# Patient Record
Sex: MALE | Race: WHITE | NOT HISPANIC OR LATINO | Employment: UNEMPLOYED | URBAN - METROPOLITAN AREA
[De-identification: names, ages, dates, MRNs, and addresses within clinical notes are randomized per-mention and may not be internally consistent; named-entity substitution may affect disease eponyms.]

---

## 2021-09-23 ENCOUNTER — HOSPITAL ENCOUNTER (OUTPATIENT)
Facility: MEDICAL CENTER | Age: 36
End: 2021-09-23
Attending: STUDENT IN AN ORGANIZED HEALTH CARE EDUCATION/TRAINING PROGRAM | Admitting: STUDENT IN AN ORGANIZED HEALTH CARE EDUCATION/TRAINING PROGRAM

## 2021-09-23 PROBLEM — S54.10XA MEDIAN NERVE LACERATION: Status: ACTIVE | Noted: 2021-09-23

## 2021-09-23 NOTE — H&P (VIEW-ONLY)
HAND & UPPER EXTREMITY  NEW PATIENT VISIT    Date of visit: 21   Referring Provider: Vy Chaudhry M.D.  PCP: No primary care provider on file.    REASON FOR VISIT:   Right wrist laceration    HISTORY:Enoch Gallego is a pleasant 36 y.o. right  hand dominant male here for a laceration to his right volar wrist. He was cleaning grout with a sheila wheel attachment on yesterday around 2pm, when it slipped and cut his volar wrist. He had moderate bleeding, and immediate loss of sensation to his thumb, index, middle and ring fingers. He was seen at the local ED where he underwent a washout and laceration repair. His tetanus is up to date (received it 4-5 months ago after a knee injury).       PAST MEDICAL HISTORY:   History reviewed. No pertinent past medical history.    PAST SURGICAL HISTORY:   History reviewed. No pertinent surgical history.    SOCIAL HISTORY:   Social History     Tobacco Use   Smoking Status Former Smoker   • Types: Cigarettes   • Quit date: 2016   • Years since quittin.8   Smokeless Tobacco Never Used     Occupation: Contractor    MEDICATIONS:    Current Outpatient Medications:   •  cephALEXin (KEFLEX) 500 MG Cap, Take 500 mg by mouth., Disp: , Rfl:   •  oxyCODONE-acetaminophen (PERCOCET) 5-325 MG Tab, Take 1 Tablet by mouth., Disp: , Rfl:   •  oxyCODONE immediate-release (ROXICODONE) 5 MG Tab, Take 1 Tablet by mouth every four hours as needed for Severe Pain for up to 5 days., Disp: 30 Tablet, Rfl: 0    ALLERGIES:  Hydrocodone-acetaminophen    PHYSICAL EXAM:   General: No acute distress  Respiratory: Unlabored on room air    Upper extremities:   Skin is clean and dry with no lacerations or abrasions.   Digits warm and well-perfused with 2 second capillary refill.  There is a 5.2cm transverse laceration in the proximal wrist.  There are a few scattered interrupted simple nylon sutures in place.  No surrounding erythema, drainage.  Appropriate swelling.  FDP and FDS are intact  to the index, middle, and small fingers.  FDP to the ring finger is intact, there is some slight pain and weakness with resisted flexion of the PIP joint of the ring finger.  FPL is intact. Kapandji score of 6.  He is able to make composite fist and fully extend all fingers.  APB 3 out of 5.  Radial and ulnar pulses are palpable at the level distal to the laceration.  + Tinel's test at laceration.     2-pt discrimination:   Right Left   Thumb >15mm <5   Index >15mm <5   Middle >15mm <5   Ring <5 radial and ulnar  <5   Small <5 <5       IMAGING:   Unable to obtain official images, however report from outside hospital of the forearm yesterday is negative for fracture or foreign body.    ASSESSMENT/PLAN: Enoch Gallego is a 36 y.o. male who presents with numbness in the median nerve distribution after sustaining a laceration to his volar wrist on 9/22/2021. History, physical exam and imaging are consistent with at the minimum a partial (possible complete) median nerve laceration. On exam he also has some painful weakness of his right ring finger FDS, FCU, and FCR.     We reviewed anatomy and discussed possible diagnoses at length.  I discussed my suspicion of a partial if not complete median nerve laceration.  What is reassuring is his maintained opposition, with a Kapandji score of 6, and present albeit weak APB.  I also discussed the possibility for tendon lacerations.  Recommend washout, exploration, repair of injured structures.  I discussed that if he does in fact have significant damage to the median nerve and I am unable to repair this primarily, I will likely harvest an ipsilateral sural nerve to aid in reconstruction.  The alternative would be use of an axogen allograft.  We will likely also placed a nerve wrap at the time of surgery.  I also discussed the need for an extended carpal tunnel release.  He voiced understanding and wishes to proceed.    We also discussed the expected postoperative course at  length.    The patient was told of the risks, benefits, and alternatives to the procedure. Risks included but not limited to bleeding, neuroma, persistent symptoms tendon rupture, infection, injury to neurovascular and tendinous structures, risk of exposure to COVID-19 within the facility, and the need for subsequent surgeries. Advanced directives were discussed,team approach explained, the role of overlapping surgeries, information for medical photography. The patient consented and wished to proceed. All questions pertaining to the procedure and these risks were answered and the patient agreed to proceed.      1. Dressing placed on incision today, and placed in a removable wrist cock-up splint.    2. Recommend Tylenol and ibuprofen, alternating this every 3 hours.  I did provide a prescription today for oxycodone for breakthrough pain.  I also discussed that I may prescribe gabapentin in the future to help with nerve pain.  3. Request for stat surgery placed      Vy Chaudhry M.D.

## 2021-09-24 ENCOUNTER — ANESTHESIA (OUTPATIENT)
Dept: SURGERY | Facility: MEDICAL CENTER | Age: 36
End: 2021-09-24

## 2021-09-24 ENCOUNTER — HOSPITAL ENCOUNTER (OUTPATIENT)
Facility: MEDICAL CENTER | Age: 36
End: 2021-09-24
Attending: STUDENT IN AN ORGANIZED HEALTH CARE EDUCATION/TRAINING PROGRAM | Admitting: STUDENT IN AN ORGANIZED HEALTH CARE EDUCATION/TRAINING PROGRAM

## 2021-09-24 ENCOUNTER — ANESTHESIA EVENT (OUTPATIENT)
Dept: SURGERY | Facility: MEDICAL CENTER | Age: 36
End: 2021-09-24

## 2021-09-24 VITALS
WEIGHT: 255.51 LBS | HEIGHT: 72 IN | DIASTOLIC BLOOD PRESSURE: 91 MMHG | BODY MASS INDEX: 34.61 KG/M2 | OXYGEN SATURATION: 91 % | RESPIRATION RATE: 16 BRPM | HEART RATE: 92 BPM | TEMPERATURE: 97.8 F | SYSTOLIC BLOOD PRESSURE: 138 MMHG

## 2021-09-24 DIAGNOSIS — S54.11XD MEDIAN NERVE LACERATION, RIGHT, SUBSEQUENT ENCOUNTER: ICD-10-CM

## 2021-09-24 LAB
EXTERNAL QUALITY CONTROL: NORMAL
SARS-COV+SARS-COV-2 AG RESP QL IA.RAPID: NEGATIVE

## 2021-09-24 PROCEDURE — 25260 REPAIR FOREARM TENDON/MUSCLE: CPT | Mod: 51ROC,59 | Performed by: STUDENT IN AN ORGANIZED HEALTH CARE EDUCATION/TRAINING PROGRAM

## 2021-09-24 PROCEDURE — 700111 HCHG RX REV CODE 636 W/ 250 OVERRIDE (IP): Performed by: STUDENT IN AN ORGANIZED HEALTH CARE EDUCATION/TRAINING PROGRAM

## 2021-09-24 PROCEDURE — A9270 NON-COVERED ITEM OR SERVICE: HCPCS | Performed by: STUDENT IN AN ORGANIZED HEALTH CARE EDUCATION/TRAINING PROGRAM

## 2021-09-24 PROCEDURE — 64874 REPAIR & REVISE NERVE ADD-ON: CPT | Mod: 59 | Performed by: STUDENT IN AN ORGANIZED HEALTH CARE EDUCATION/TRAINING PROGRAM

## 2021-09-24 PROCEDURE — 160046 HCHG PACU - 1ST 60 MINS PHASE II: Performed by: STUDENT IN AN ORGANIZED HEALTH CARE EDUCATION/TRAINING PROGRAM

## 2021-09-24 PROCEDURE — 700101 HCHG RX REV CODE 250: Performed by: STUDENT IN AN ORGANIZED HEALTH CARE EDUCATION/TRAINING PROGRAM

## 2021-09-24 PROCEDURE — 87426 SARSCOV CORONAVIRUS AG IA: CPT | Performed by: STUDENT IN AN ORGANIZED HEALTH CARE EDUCATION/TRAINING PROGRAM

## 2021-09-24 PROCEDURE — 700105 HCHG RX REV CODE 258: Performed by: STUDENT IN AN ORGANIZED HEALTH CARE EDUCATION/TRAINING PROGRAM

## 2021-09-24 PROCEDURE — 160029 HCHG SURGERY MINUTES - 1ST 30 MINS LEVEL 4: Performed by: STUDENT IN AN ORGANIZED HEALTH CARE EDUCATION/TRAINING PROGRAM

## 2021-09-24 PROCEDURE — 501838 HCHG SUTURE GENERAL: Performed by: STUDENT IN AN ORGANIZED HEALTH CARE EDUCATION/TRAINING PROGRAM

## 2021-09-24 PROCEDURE — 160009 HCHG ANES TIME/MIN: Performed by: STUDENT IN AN ORGANIZED HEALTH CARE EDUCATION/TRAINING PROGRAM

## 2021-09-24 PROCEDURE — 64721 CARPAL TUNNEL SURGERY: CPT | Mod: RT | Performed by: STUDENT IN AN ORGANIZED HEALTH CARE EDUCATION/TRAINING PROGRAM

## 2021-09-24 PROCEDURE — 160025 RECOVERY II MINUTES (STATS): Performed by: STUDENT IN AN ORGANIZED HEALTH CARE EDUCATION/TRAINING PROGRAM

## 2021-09-24 PROCEDURE — 160036 HCHG PACU - EA ADDL 30 MINS PHASE I: Performed by: STUDENT IN AN ORGANIZED HEALTH CARE EDUCATION/TRAINING PROGRAM

## 2021-09-24 PROCEDURE — 160041 HCHG SURGERY MINUTES - EA ADDL 1 MIN LEVEL 4: Performed by: STUDENT IN AN ORGANIZED HEALTH CARE EDUCATION/TRAINING PROGRAM

## 2021-09-24 PROCEDURE — C1767 GENERATOR, NEURO NON-RECHARG: HCPCS | Performed by: STUDENT IN AN ORGANIZED HEALTH CARE EDUCATION/TRAINING PROGRAM

## 2021-09-24 PROCEDURE — A4565 SLINGS: HCPCS | Performed by: STUDENT IN AN ORGANIZED HEALTH CARE EDUCATION/TRAINING PROGRAM

## 2021-09-24 PROCEDURE — 160002 HCHG RECOVERY MINUTES (STAT): Performed by: STUDENT IN AN ORGANIZED HEALTH CARE EDUCATION/TRAINING PROGRAM

## 2021-09-24 PROCEDURE — 160048 HCHG OR STATISTICAL LEVEL 1-5: Performed by: STUDENT IN AN ORGANIZED HEALTH CARE EDUCATION/TRAINING PROGRAM

## 2021-09-24 PROCEDURE — 700102 HCHG RX REV CODE 250 W/ 637 OVERRIDE(OP): Performed by: STUDENT IN AN ORGANIZED HEALTH CARE EDUCATION/TRAINING PROGRAM

## 2021-09-24 PROCEDURE — 64415 NJX AA&/STRD BRCH PLXS IMG: CPT | Performed by: STUDENT IN AN ORGANIZED HEALTH CARE EDUCATION/TRAINING PROGRAM

## 2021-09-24 PROCEDURE — 160035 HCHG PACU - 1ST 60 MINS PHASE I: Performed by: STUDENT IN AN ORGANIZED HEALTH CARE EDUCATION/TRAINING PROGRAM

## 2021-09-24 PROCEDURE — C1763 CONN TISS, NON-HUMAN: HCPCS | Performed by: STUDENT IN AN ORGANIZED HEALTH CARE EDUCATION/TRAINING PROGRAM

## 2021-09-24 PROCEDURE — 700111 HCHG RX REV CODE 636 W/ 250 OVERRIDE (IP)

## 2021-09-24 PROCEDURE — 99291 CRITICAL CARE FIRST HOUR: CPT

## 2021-09-24 DEVICE — GUIDE NERVE 7.0MMX40MM: Type: IMPLANTABLE DEVICE | Site: WRIST | Status: FUNCTIONAL

## 2021-09-24 RX ORDER — ROPIVACAINE HYDROCHLORIDE 5 MG/ML
INJECTION, SOLUTION EPIDURAL; INFILTRATION; PERINEURAL
Status: DISCONTINUED | OUTPATIENT
Start: 2021-09-24 | End: 2021-09-24 | Stop reason: SURG

## 2021-09-24 RX ORDER — KETOROLAC TROMETHAMINE 30 MG/ML
INJECTION, SOLUTION INTRAMUSCULAR; INTRAVENOUS
Status: COMPLETED
Start: 2021-09-24 | End: 2021-09-24

## 2021-09-24 RX ORDER — SODIUM CHLORIDE, SODIUM LACTATE, POTASSIUM CHLORIDE, CALCIUM CHLORIDE 600; 310; 30; 20 MG/100ML; MG/100ML; MG/100ML; MG/100ML
INJECTION, SOLUTION INTRAVENOUS CONTINUOUS
Status: ACTIVE | OUTPATIENT
Start: 2021-09-24 | End: 2021-09-24

## 2021-09-24 RX ORDER — KETAMINE HYDROCHLORIDE 50 MG/ML
INJECTION, SOLUTION INTRAMUSCULAR; INTRAVENOUS PRN
Status: DISCONTINUED | OUTPATIENT
Start: 2021-09-24 | End: 2021-09-24 | Stop reason: SURG

## 2021-09-24 RX ORDER — HYDROMORPHONE HYDROCHLORIDE 1 MG/ML
0.2 INJECTION, SOLUTION INTRAMUSCULAR; INTRAVENOUS; SUBCUTANEOUS
Status: DISCONTINUED | OUTPATIENT
Start: 2021-09-24 | End: 2021-09-24 | Stop reason: HOSPADM

## 2021-09-24 RX ORDER — OXYCODONE HCL 5 MG/5 ML
5 SOLUTION, ORAL ORAL
Status: COMPLETED | OUTPATIENT
Start: 2021-09-24 | End: 2021-09-24

## 2021-09-24 RX ORDER — HYDROMORPHONE HYDROCHLORIDE 1 MG/ML
0.1 INJECTION, SOLUTION INTRAMUSCULAR; INTRAVENOUS; SUBCUTANEOUS
Status: DISCONTINUED | OUTPATIENT
Start: 2021-09-24 | End: 2021-09-24 | Stop reason: HOSPADM

## 2021-09-24 RX ORDER — ONDANSETRON 2 MG/ML
4 INJECTION INTRAMUSCULAR; INTRAVENOUS
Status: COMPLETED | OUTPATIENT
Start: 2021-09-24 | End: 2021-09-24

## 2021-09-24 RX ORDER — MEPERIDINE HYDROCHLORIDE 25 MG/ML
12.5 INJECTION INTRAMUSCULAR; INTRAVENOUS; SUBCUTANEOUS
Status: DISCONTINUED | OUTPATIENT
Start: 2021-09-24 | End: 2021-09-24 | Stop reason: HOSPADM

## 2021-09-24 RX ORDER — HYDRALAZINE HYDROCHLORIDE 20 MG/ML
5 INJECTION INTRAMUSCULAR; INTRAVENOUS
Status: DISCONTINUED | OUTPATIENT
Start: 2021-09-24 | End: 2021-09-24 | Stop reason: HOSPADM

## 2021-09-24 RX ORDER — BUPIVACAINE HYDROCHLORIDE 2.5 MG/ML
INJECTION, SOLUTION EPIDURAL; INFILTRATION; INTRACAUDAL
Status: DISCONTINUED
Start: 2021-09-24 | End: 2021-09-24 | Stop reason: HOSPADM

## 2021-09-24 RX ORDER — KETOROLAC TROMETHAMINE 30 MG/ML
30 INJECTION, SOLUTION INTRAMUSCULAR; INTRAVENOUS ONCE
Status: COMPLETED | OUTPATIENT
Start: 2021-09-24 | End: 2021-09-24

## 2021-09-24 RX ORDER — GABAPENTIN 100 MG/1
100 CAPSULE ORAL 3 TIMES DAILY
Qty: 90 CAPSULE | Refills: 0 | Status: SHIPPED | OUTPATIENT
Start: 2021-09-24 | End: 2021-10-24

## 2021-09-24 RX ORDER — LABETALOL HYDROCHLORIDE 5 MG/ML
5 INJECTION, SOLUTION INTRAVENOUS
Status: DISCONTINUED | OUTPATIENT
Start: 2021-09-24 | End: 2021-09-24 | Stop reason: HOSPADM

## 2021-09-24 RX ORDER — METOPROLOL TARTRATE 1 MG/ML
1 INJECTION, SOLUTION INTRAVENOUS
Status: DISCONTINUED | OUTPATIENT
Start: 2021-09-24 | End: 2021-09-24 | Stop reason: HOSPADM

## 2021-09-24 RX ORDER — KETOROLAC TROMETHAMINE 30 MG/ML
30 INJECTION, SOLUTION INTRAMUSCULAR; INTRAVENOUS ONCE
Status: DISCONTINUED | OUTPATIENT
Start: 2021-09-24 | End: 2021-09-24

## 2021-09-24 RX ORDER — HYDROMORPHONE HYDROCHLORIDE 1 MG/ML
0.4 INJECTION, SOLUTION INTRAMUSCULAR; INTRAVENOUS; SUBCUTANEOUS
Status: DISCONTINUED | OUTPATIENT
Start: 2021-09-24 | End: 2021-09-24 | Stop reason: HOSPADM

## 2021-09-24 RX ORDER — DEXAMETHASONE SODIUM PHOSPHATE 4 MG/ML
INJECTION, SOLUTION INTRA-ARTICULAR; INTRALESIONAL; INTRAMUSCULAR; INTRAVENOUS; SOFT TISSUE PRN
Status: DISCONTINUED | OUTPATIENT
Start: 2021-09-24 | End: 2021-09-24 | Stop reason: SURG

## 2021-09-24 RX ORDER — DEXMEDETOMIDINE HYDROCHLORIDE 100 UG/ML
INJECTION, SOLUTION INTRAVENOUS PRN
Status: DISCONTINUED | OUTPATIENT
Start: 2021-09-24 | End: 2021-09-24 | Stop reason: SURG

## 2021-09-24 RX ORDER — CEFAZOLIN SODIUM 1 G/3ML
INJECTION, POWDER, FOR SOLUTION INTRAMUSCULAR; INTRAVENOUS PRN
Status: DISCONTINUED | OUTPATIENT
Start: 2021-09-24 | End: 2021-09-24 | Stop reason: SURG

## 2021-09-24 RX ORDER — IPRATROPIUM BROMIDE AND ALBUTEROL SULFATE 2.5; .5 MG/3ML; MG/3ML
3 SOLUTION RESPIRATORY (INHALATION)
Status: DISCONTINUED | OUTPATIENT
Start: 2021-09-24 | End: 2021-09-24 | Stop reason: HOSPADM

## 2021-09-24 RX ORDER — OXYCODONE HCL 5 MG/5 ML
10 SOLUTION, ORAL ORAL
Status: COMPLETED | OUTPATIENT
Start: 2021-09-24 | End: 2021-09-24

## 2021-09-24 RX ORDER — HALOPERIDOL 5 MG/ML
1 INJECTION INTRAMUSCULAR
Status: DISCONTINUED | OUTPATIENT
Start: 2021-09-24 | End: 2021-09-24 | Stop reason: HOSPADM

## 2021-09-24 RX ORDER — METOCLOPRAMIDE HYDROCHLORIDE 5 MG/ML
INJECTION INTRAMUSCULAR; INTRAVENOUS PRN
Status: DISCONTINUED | OUTPATIENT
Start: 2021-09-24 | End: 2021-09-24 | Stop reason: SURG

## 2021-09-24 RX ORDER — CEPHALEXIN 500 MG/1
500 CAPSULE ORAL 4 TIMES DAILY
COMMUNITY
Start: 2021-09-22 | End: 2022-05-19

## 2021-09-24 RX ORDER — MIDAZOLAM HYDROCHLORIDE 1 MG/ML
1 INJECTION INTRAMUSCULAR; INTRAVENOUS
Status: DISCONTINUED | OUTPATIENT
Start: 2021-09-24 | End: 2021-09-24 | Stop reason: HOSPADM

## 2021-09-24 RX ORDER — DIPHENHYDRAMINE HYDROCHLORIDE 50 MG/ML
12.5 INJECTION INTRAMUSCULAR; INTRAVENOUS
Status: DISCONTINUED | OUTPATIENT
Start: 2021-09-24 | End: 2021-09-24 | Stop reason: HOSPADM

## 2021-09-24 RX ADMIN — FENTANYL CITRATE 100 MCG: 50 INJECTION, SOLUTION INTRAMUSCULAR; INTRAVENOUS at 11:18

## 2021-09-24 RX ADMIN — KETOROLAC TROMETHAMINE 30 MG: 30 INJECTION, SOLUTION INTRAMUSCULAR; INTRAVENOUS at 14:29

## 2021-09-24 RX ADMIN — METOCLOPRAMIDE 10 MG: 5 INJECTION, SOLUTION INTRAMUSCULAR; INTRAVENOUS at 11:56

## 2021-09-24 RX ADMIN — Medication 100 MG: at 11:21

## 2021-09-24 RX ADMIN — SODIUM CHLORIDE, POTASSIUM CHLORIDE, SODIUM LACTATE AND CALCIUM CHLORIDE 1000 ML: 600; 310; 30; 20 INJECTION, SOLUTION INTRAVENOUS at 11:30

## 2021-09-24 RX ADMIN — ONDANSETRON 4 MG: 2 INJECTION INTRAMUSCULAR; INTRAVENOUS at 16:38

## 2021-09-24 RX ADMIN — OXYCODONE HYDROCHLORIDE 10 MG: 5 SOLUTION ORAL at 15:10

## 2021-09-24 RX ADMIN — METOPROLOL TARTRATE 1 MG: 5 INJECTION, SOLUTION INTRAVENOUS at 14:35

## 2021-09-24 RX ADMIN — SODIUM CHLORIDE, POTASSIUM CHLORIDE, SODIUM LACTATE AND CALCIUM CHLORIDE: 600; 310; 30; 20 INJECTION, SOLUTION INTRAVENOUS at 11:16

## 2021-09-24 RX ADMIN — FENTANYL CITRATE 25 MCG: 0.05 INJECTION, SOLUTION INTRAMUSCULAR; INTRAVENOUS at 15:25

## 2021-09-24 RX ADMIN — DEXMEDETOMIDINE 6 MCG: 200 INJECTION, SOLUTION INTRAVENOUS at 11:51

## 2021-09-24 RX ADMIN — EPHEDRINE SULFATE 10 MG: 50 INJECTION INTRAMUSCULAR; INTRAVENOUS; SUBCUTANEOUS at 13:05

## 2021-09-24 RX ADMIN — KETOROLAC TROMETHAMINE 30 MG: 30 INJECTION, SOLUTION INTRAMUSCULAR at 14:29

## 2021-09-24 RX ADMIN — KETAMINE HYDROCHLORIDE 50 MG: 50 INJECTION INTRAMUSCULAR; INTRAVENOUS at 11:21

## 2021-09-24 RX ADMIN — EPHEDRINE SULFATE 10 MG: 50 INJECTION INTRAMUSCULAR; INTRAVENOUS; SUBCUTANEOUS at 12:08

## 2021-09-24 RX ADMIN — EPHEDRINE SULFATE 10 MG: 50 INJECTION INTRAMUSCULAR; INTRAVENOUS; SUBCUTANEOUS at 12:03

## 2021-09-24 RX ADMIN — FENTANYL CITRATE 25 MCG: 0.05 INJECTION, SOLUTION INTRAMUSCULAR; INTRAVENOUS at 15:10

## 2021-09-24 RX ADMIN — METOPROLOL TARTRATE 1 MG: 5 INJECTION, SOLUTION INTRAVENOUS at 14:43

## 2021-09-24 RX ADMIN — PROPOFOL 150 MG: 10 INJECTION, EMULSION INTRAVENOUS at 11:21

## 2021-09-24 RX ADMIN — METOPROLOL TARTRATE 1 MG: 5 INJECTION, SOLUTION INTRAVENOUS at 15:40

## 2021-09-24 RX ADMIN — FENTANYL CITRATE 50 MCG: 50 INJECTION, SOLUTION INTRAMUSCULAR; INTRAVENOUS at 15:55

## 2021-09-24 RX ADMIN — ROPIVACAINE HYDROCHLORIDE 30 ML: 5 INJECTION, SOLUTION EPIDURAL; INFILTRATION; PERINEURAL at 13:45

## 2021-09-24 RX ADMIN — MIDAZOLAM 2 MG: 1 INJECTION INTRAMUSCULAR; INTRAVENOUS at 11:16

## 2021-09-24 RX ADMIN — CEFAZOLIN 3 G: 330 INJECTION, POWDER, FOR SOLUTION INTRAMUSCULAR; INTRAVENOUS at 11:22

## 2021-09-24 RX ADMIN — DEXAMETHASONE SODIUM PHOSPHATE 10 MG: 4 INJECTION, SOLUTION INTRA-ARTICULAR; INTRALESIONAL; INTRAMUSCULAR; INTRAVENOUS; SOFT TISSUE at 11:26

## 2021-09-24 RX ADMIN — DEXMEDETOMIDINE 4 MCG: 200 INJECTION, SOLUTION INTRAVENOUS at 11:21

## 2021-09-24 ASSESSMENT — PAIN DESCRIPTION - PAIN TYPE
TYPE: SURGICAL PAIN

## 2021-09-24 ASSESSMENT — PAIN SCALES - GENERAL: PAIN_LEVEL: 0

## 2021-09-24 ASSESSMENT — LIFESTYLE VARIABLES: DO YOU DRINK ALCOHOL: NO

## 2021-09-24 NOTE — ED PROVIDER NOTES
ED Provider Note    CHIEF COMPLAINT  Chief Complaint   Patient presents with   • Laceration     pt sent from [facility] after a accident from a tool at home. pt is here for surgery on his left wrist median nerve.        HPI  Enoch Gallego is a 36 y.o. male who presents to this emergency department because of a required ED visit in order to get OR time.  Patient sustained a laceration to his right forearm at home.  Was seen at [facility].  Was noted to have a median nerve injury.  His wound was repaired.  He followed up with plastic surgery today.  Plan was to take him to the OR, but this could not be accomplished unless the patient came to the ER.  He has no new complaints.  Very limited evaluation is undertaken    PHYSICAL EXAM  VITAL SIGNS: /81   Pulse 77   Temp 35.9 °C (96.7 °F) (Temporal)   Resp 18   Ht 1.829 m (6')   Wt 116 kg (255 lb 8.2 oz)   SpO2 97%   BMI 34.65 kg/m²    Constitutional: Awake and alert.       COURSE & MEDICAL DECISION MAKING  Spoke with the surgeon.  Unfortunately the only way to get him to the OR was through the emergency department.  He does not have any acute complaints.      FINAL IMPRESSION  1.  Right arm laceration          This dictation was created using voice recognition software. The accuracy of the dictation is limited to the abilities of the software.  The nursing notes were reviewed and certain aspects of this information were incorporated into this note.      Electronically signed by: Sergei Hodgson M.D., 9/24/2021 10:36 AM

## 2021-09-24 NOTE — ANESTHESIA PROCEDURE NOTES
Airway    Date/Time: 9/24/2021 11:22 AM  Performed by: Randy Nixon M.D.  Authorized by: Randy Nixon M.D.     Location:  OR  Urgency:  Elective  Difficult Airway: No    Indications for Airway Management:  Anesthesia      Spontaneous Ventilation: absent    Sedation Level:  Deep  Preoxygenated: Yes    Patient Position:  Sniffing  Final Airway Type:  Endotracheal airway  Final Endotracheal Airway:  ETT  Cuffed: Yes    Technique Used for Successful ETT Placement:  Direct laryngoscopy    Insertion Site:  Oral  Blade Type:  Brito  Laryngoscope Blade/Videolaryngoscope Blade Size:  2  ETT Size (mm):  8.0  Measured from:  Teeth  ETT to Teeth (cm):  24  Placement Verified by: auscultation and capnometry    Cormack-Lehane Classification:  Grade IIb - view of arytenoids or posterior of glottis only  Number of Attempts at Approach:  1  Ventilation Between Attempts:  None  Number of Other Approaches Attempted:  0

## 2021-09-24 NOTE — ANESTHESIA PROCEDURE NOTES
Peripheral Block    Date/Time: 9/24/2021 1:45 PM  Performed by: Randy Nixon M.D.  Authorized by: Randy Nixon M.D.     Start Time:  9/24/2021 1:45 PM  End Time:  9/24/2021 1:50 PM  Reason for Block: at surgeon's request and post-op pain management ONLY    patient identified, IV checked, site marked, risks and benefits discussed, surgical consent, monitors and equipment checked, pre-op evaluation and timeout performed    Patient Position:  Supine  Prep: ChloraPrep    Monitoring:  Heart rate, continuous pulse ox and cardiac monitor  Block Region:  Upper Extremity  Upper Extremity - Block Type:  BRACHIAL PLEXUS block, Supraclavicular approach    Laterality:  Right  Procedures: ultrasound guided  Image captured, interpreted and electronically stored.  Local Infiltration:  Lidocaine  Strength:  1 %  Dose:  3 ml  Block Type:  Single-shot  Needle Length:  100mm  Needle Gauge:  21 G  Needle Localization:  Ultrasound guidance  Injection Assessment:  Negative aspiration for heme, no paresthesia on injection, incremental injection and local visualized surrounding nerve on ultrasound  Evidence of intravascular injection: No     US Guided Supraclavicular Brachial Plexus Block    US transducer placed cephalad and parallel to clavicle in angle to view the subclavian artery with the brachial plexus lateral and superficial to the artery. Needle inserted lateral to the transducer in-plane and advanced with the needle tip visualized continually into the perineural position. After negative aspiration, LA injected without resistance.

## 2021-09-24 NOTE — ANESTHESIA POSTPROCEDURE EVALUATION
Patient: Enoch Gallego    Procedure Summary     Date: 09/24/21 Room / Location: [facility] ROOM 21 / SURGERY SAME DAY [facility]    Anesthesia Start: 1116 Anesthesia Stop: 1405    Procedures:       EXPLORATION, WRIST - AND WASHOUT, NERVE PROTECTOR ALLOGRAFT (Right Wrist)      REPAIR, NERVE (Right Wrist)      REPAIR, TENDON (Right Wrist)      RELEASE, CARPAL TUNNEL (Right Wrist) Diagnosis:       Median nerve laceration      (Median nerve laceration [S54.10XA])    Surgeons: Vy Chaudhry M.D. Responsible Provider: Randy Nixon M.D.    Anesthesia Type: general ASA Status: 1          Final Anesthesia Type: general  Last vitals  BP   Blood Pressure: 131/75    Temp   36.6 °C (97.8 °F)    Pulse   (!) 140   Resp   18    SpO2   95 %      Anesthesia Post Evaluation    Patient location during evaluation: PACU  Patient participation: complete - patient participated  Level of consciousness: awake and alert  Pain score: 0    Airway patency: patent  Anesthetic complications: no  Cardiovascular status: hemodynamically stable  Respiratory status: acceptable  Hydration status: euvolemic    PONV: none          No complications documented.

## 2021-09-24 NOTE — ANESTHESIA TIME REPORT
Anesthesia Start and Stop Event Times     Date Time Event    9/24/2021 1057 Ready for Procedure     1116 Anesthesia Start     1405 Anesthesia Stop        Responsible Staff  09/24/21    Name Role Begin End    Randy Nixon M.D. Anesth 1116 1405        Preop Diagnosis (Free Text):  Pre-op Diagnosis     Median nerve laceration [S54.10XA]        Preop Diagnosis (Codes):  Diagnosis Information     Diagnosis Code(s): Median nerve laceration [S54.10XA]        Post op Diagnosis  Median nerve laceration      Premium Reason  Non-Premium    Comments:

## 2021-09-24 NOTE — ANESTHESIA PROCEDURE NOTES
Peripheral IV    Date/Time: 9/24/2021 11:48 AM  Performed by: Randy Nixon M.D.  Authorized by: Randy Nixon M.D.     Size:  16 G  Laterality:  Left  Local Anesthetic:  None  Site Prep:  Alcohol  Technique:  Direct puncture  Attempts:  1  Difficult IV necessitating physician skill: IV access difficult     16 ga L hand

## 2021-09-24 NOTE — ANESTHESIA PREPROCEDURE EVALUATION
Anes H&P:  PAST MEDICAL HISTORY:   36 y.o. male who presents for Procedure(s) (LRB):  EXPLORATION, WRIST - AND WASHOUT, POSSIBLE SURAL NERVE HARVEST, POSSIBLE NERVE ALLOGRAFT OR NERVE WRAP (Right)  REPAIR, NERVE - POSSIBLE  REPAIR, TENDON - POSSIBLE  RELEASE, CARPAL TUNNEL - PROCEED AS INDICATED.  He has current and past medical problems significant for:    History reviewed. No pertinent past medical history.    SMOKING/ALCOHOL/RECREATIONAL DRUG USE:  Social History     Tobacco Use   • Smoking status: Former Smoker     Types: Cigarettes     Quit date: 2016     Years since quittin.8   • Smokeless tobacco: Never Used   Substance Use Topics   • Alcohol use: Yes   • Drug use: Never     Social History     Substance and Sexual Activity   Drug Use Never       PAST SURGICAL HISTORY:  History reviewed. No pertinent surgical history.    ALLERGIES:   Allergies   Allergen Reactions   • Hydrocodone-Acetaminophen      itching       MEDICATIONS:  No current facility-administered medications on file prior to encounter.     Current Outpatient Medications on File Prior to Encounter   Medication Sig Dispense Refill   • cephALEXin (KEFLEX) 500 MG Cap Take 500 mg by mouth 4 times a day.     • oxyCODONE immediate-release (ROXICODONE) 5 MG Tab Take 1 Tablet by mouth every four hours as needed for Severe Pain for up to 5 days. 30 Tablet 0       LABS:  No results found for: HEMOGLOBIN, HEMATOCRIT, WBC  No results found for: SODIUM, POTASSIUM, CHLORIDE, CO2, GLUCOSE, BUN, CALCIUM    COVID-19 Status: Pending      PREVIOUS ANESTHETICS: See EMR  __________________________________________      Relevant Problems   No relevant active problems       Physical Exam    Airway   Mallampati: II  TM distance: >3 FB  Neck ROM: full       Cardiovascular - normal exam  Rhythm: regular  Rate: normal  (-) murmur     Dental - normal exam           Pulmonary - normal exam  Breath sounds clear to auscultation     Abdominal    Neurological - normal  exam                 Anesthesia Plan    ASA 2       Plan - general       Airway plan will be ETT          Induction: intravenous    Postoperative Plan: Postoperative administration of opioids is intended.    Pertinent diagnostic labs and testing reviewed    Informed Consent:    Anesthetic plan and risks discussed with patient.    Use of blood products discussed with: patient whom consented to blood products.

## 2021-09-24 NOTE — ED TRIAGE NOTES
..  Chief Complaint   Patient presents with   • Laceration     pt sent from [employer] after a accident from a tool at home. pt is here for surgery on his left wrist median nerve.        ./81   Pulse 77   Temp 35.9 °C (96.7 °F) (Temporal)   Resp 18   Ht 1.829 m (6')   Wt 116 kg (255 lb 8.2 oz)   SpO2 97%        Explained triage process, to waiting room. Asked to inform RN if questions or concerns arise.

## 2021-09-24 NOTE — OR NURSING
1520 - Received report and assumed care from VALARIE Lemus    1525 - Fentanyl 25 mcg 6/10 left arm burning senstation    1735 - discharge instructions prrovided to wife, Beena, who expresses understanding    1545 - Lopressor 1mg for     1555 - Fentanyl 50mcg 7/10 left hand burning    1600 - Pain now 4/10. Phase 1 recovery completed    1630 - Up to chair and dressed. States dizziness.    1640 - Zofran for nausea    1700 - Nausea subsided. Discharged via wheelchair to care of wife

## 2021-09-24 NOTE — DISCHARGE INSTRUCTIONS
ACTIVITY: Rest and take it easy for the first 24 hours.  A responsible adult is recommended to remain with you during that time.  It is normal to feel sleepy.  We encourage you to not do anything that requires balance, judgment or coordination.    MILD FLU-LIKE SYMPTOMS ARE NORMAL. YOU MAY EXPERIENCE GENERALIZED MUSCLE ACHES, THROAT IRRITATION, HEADACHE AND/OR SOME NAUSEA.    FOR 24 HOURS DO NOT:  Drive, operate machinery or run household appliances.  Drink beer or alcoholic beverages.   Make important decisions or sign legal documents.    SPECIAL INSTRUCTIONS: follow [place] post op instructions attached    DIET: To avoid nausea, slowly advance diet as tolerated, avoiding spicy or greasy foods for the first day.  Add more substantial food to your diet according to your physician's instructions.  Babies can be fed formula or breast milk as soon as they are hungry.  INCREASE FLUIDS AND FIBER TO AVOID CONSTIPATION.    SURGICAL DRESSING/BATHING: keep dressing clean and dry     FOLLOW-UP APPOINTMENT:  A follow-up appointment should be arranged with your doctor; call to schedule.    You should CALL YOUR PHYSICIAN if you develop:  Fever greater than 101 degrees F.  Pain not relieved by medication, or persistent nausea or vomiting.  Excessive bleeding (blood soaking through dressing) or unexpected drainage from the wound.  Extreme redness or swelling around the incision site, drainage of pus or foul smelling drainage.  Inability to urinate or empty your bladder within 8 hours.  Problems with breathing or chest pain.    You should call 911 if you develop problems with breathing or chest pain.  If you are unable to contact your doctor or surgical center, you should go to the nearest emergency room or urgent care center.  Physician's telephone #: Dr. HUMPHRIES  [phone]    If any questions arise, call your doctor.  If your doctor is not available, please feel free to call the Surgical Center at [phone]. The Contact  Center is open Monday through Friday 7AM to 5PM and may speak to a nurse at [phone], or toll free at [phone].     A registered nurse may call you a few days after your surgery to see how you are doing after your procedure.    MEDICATIONS: Resume taking daily medication.  Take prescribed pain medication with food.  If no medication is prescribed, you may take non-aspirin pain medication if needed.  PAIN MEDICATION CAN BE VERY CONSTIPATING.  Take a stool softener or laxative such as senokot, pericolace, or milk of magnesia if needed.    Prescription given for Oxycodone & Gabapentin.      If your physician has prescribed pain medication that includes Acetaminophen (Tylenol), do not take additional Acetaminophen (Tylenol) while taking the prescribed medication.    Depression / Suicide Risk    As you are discharged from this [facility] facility, it is important to learn how to keep safe from harming yourself.    Recognize the warning signs:  · Abrupt changes in personality, positive or negative- including increase in energy   · Giving away possessions  · Change in eating patterns- significant weight changes-  positive or negative  · Change in sleeping patterns- unable to sleep or sleeping all the time   · Unwillingness or inability to communicate  · Depression  · Unusual sadness, discouragement and loneliness  · Talk of wanting to die  · Neglect of personal appearance   · Rebelliousness- reckless behavior  · Withdrawal from people/activities they love  · Confusion- inability to concentrate     If you or a loved one observes any of these behaviors or has concerns about self-harm, here's what you can do:  · Talk about it- your feelings and reasons for harming yourself  · Remove any means that you might use to hurt yourself (examples: pills, rope, extension cords, firearm)  · Get professional help from the community (Mental Health, Substance Abuse, psychological counseling)  · Do not be alone:Call your Safe  Contact- someone whom you trust who will be there for you.  · Call your local CRISIS HOTLINE 376-3459 or 307-387-3769  · Call your local Children's Mobile Crisis Response Team [place] (843) 087-2156 or www.myEnergyPlatform.com  · Call the toll free National Suicide Prevention Hotlines   · National Suicide Prevention Lifeline 803-717-GKJE (5310)  · National [employer] 800-SUICIDE (406-1210)

## 2021-09-24 NOTE — OR NURSING
1400 from OR to PACU 9. Connected to monitor. Report received from anesthesia & RN. VSS. 02 6L via mask with oral airway. Breaths calm, even, unlabored.      Surgical dressing with cast and sling to R arm; clean and dry.     1455 Spoke to pt's wife & updated to status & plan of care. Wife waiting in waiting room.     1518 Handoff to Ace SHEPPARD.

## 2021-09-24 NOTE — DISCHARGE INSTR - OTHER INFO
DR. HUMPHRIES'S POST-OPERATIVE INSTRUCTIONS    You have just undergone a sugery by Dr. Humphries in the operating room.  It is our wish that your postoperative recovery be as quick and comfortable as possible.  Please carefully review the following items that are important for your recovery.    After any operation, a certain degree of pain is to be expected. Take Advil (ibuprofen) and Extra Strength Tylenol as first line medications for mild to moderate pain. Taking each one every 6 hours, and staggering them so that you are taking one medicine every 3 hours, is the most effective. Refer to dosing instructions on the bottle, but in general ibuprofen dose is 600-800mg and Tylenol dose is 500-1000mg. For most small procedures, this should be enough to keep you comfortable. Take these medications until your followup visit. You may have been given a small prescription for stronger pain medicine which will help relieve more severe pain.  Pain medicine may make you drowsy so please keep this in mind.  Do not drive while taking pain medicine.      When you go home, please keep your operated arm elevated at all times (above the level of your heart).  If you do this, your swelling will resolve more quickly and your pain will be improve more quickly as well. You may also place an ice pack over your dressing or splint to help with swelling and pain.    You have a splint on, this should remain on until follow up or as specifically instructed. If you feel that your dressing is too tight during the first 3 days after surgery, you may loosen it. It is normal to see minor staining on the dressing after surgery. If there is significant bleeding, you are advised to call the office during regular office hours to have this checked.  Make sure that your dressing is kept dry at all times.  You can take a shower if you cover your arm with a  plastic bag. If your dressing gets wet, replace it with sterile dressing that you can obtain from your local drug store.    You have a follow up appointment on 10/12/21. If you cannot make this, please call the number below to reschedule.  The sutures will be removed and you may be asked to see a hand therapist to optimize your functional result. Each of the hand therapists that you will be referred to have received special training in the care of the hand and upper extremity.    If you have questions regarding your surgery postop that you feel requires attention, please call the office at [phone] during business hours, or [phone] after hours for the answering service. If you feel that you have a surgical emergency postoperatively that requires immediate attention, please call the above numbers or go to the Emergency Department and ask for the Orthopedic

## 2021-09-26 NOTE — OP REPORT
OPERATIVE NOTE  Date of procedure: 09/26/21    Pre-operative Diagnosis   1.  Right wrist laceration      Post-operative diagnosis   1.  Right wrist laceration  2.  Right median nerve laceration, complete  3.  Right flexor carpi radialis laceration, partial, 50%  4.  Right palmaris longus laceration, complete  5.  Right flexor digitorum superficialis to the ring finger laceration, partial 25%  6.  Right flexor digitorum superficialis to the long finger, partial 5%      Procedure   1. Washout and exploration right wrist laceration  2. Extensive neurolysis of the right median nerve   3. Right carpal tunnel release  4. Right median nerve repair, primary  5. Use of an operating microscope  5. Placement of AxoGuard nerve wrap to the median nerve coaptation   6. Repair of right FCR laceration (6 strand repair with epitendinous)   7. Repair of right palmaris longus laceration (4 strand repair)  8.  Repair of right ring FDS laceration (4 strand repair)  9. Debridement of right long finger FDS laceration          Surgeon   1. Vy Chaudhry MD       Corie Gallego is a 36 y.o. male who presented to my clinic on 9/23/21 after sustaining a laceration to his right volar wrist on 9/22/21 with a sheila circular blade on a dremmel. Exam was consistent with tendon and nerve injuries.  The patient is indicated for the above stated procedure.       Anesthesia   General    Tourniquet Time   Tourniquet was inflated to 250 mm Hg for 100 minutes     Implants  None      Complications   None          Informed Consent   Patient was told of the risks, benefits, alternative to the procedure.  Risks included, but not limited to, infection, wound healing issues, injury to neurovascular and tendinous structures, permanent lateral foot numbness, neuroma, tendon rupture and/or adhesions, incomplete resolution of their symptoms, the need for subsequent surgeries.  Advanced directive were discussed, team approach explained, they  understand the role of overlapping surgeries, the use of medical photography was reviewed.  The patient consented and wished to proceed.         Procedural Pause   The patient's correct identity, side, site, procedure to be performed was verified.  Intravenous antibiotics were administered prior to skin incision.         Procedural Note   The patient was brought to the operating room where they were transferred to the operating room table and were secured with safety straps.  A well-padded, nonsterile tourniquet was applied to the upper arm.  Our anesthesia colleagues then placed the patient under general anesthesia.  At which point the right upper extremity as well as the right lower extremity (in the event a sural nerve graft was needed) were prepped and draped in standard sterile fashion. The upper extremity was exsanguinated with Esmarch and tourniquet was inflated to 250 mm of Hg.      Nylon sutures in his transverse laceration were removed. Blunt gentle dissection easily  the underlying tissues.  The incision was extended proximally and distally to facilitate exploration. Tenotomy dissection was carried down in both directions to the level of the antebrachial fascia and the antebrachial fascia was released. Wound exploration revealed a 50% laceration to FCR, 100% laceration of the palmaris longus, complete laceration to the median nerve, and partial lacerations to FDS to the middle and ring fingers.     FDS to the index and small were intact. FPL was intact. FDPs were all intact. Ulnar neurovascular bundle was intact. Radial artery was intact.     The wound was copiously irrigated with sterile saline.     First, FCR was repaired with 3-0 Supramid in a modified tsuge technique. A 5-0 prolene was used for an epitendinous repair.     2 3-0 vicryl interrupted figure of 8 sutures were used to repair the palmaris longus tendon.     A modified Nj using 3-0 vicryl was used to repair FDS to the ring  finger.     FDS to the middle finger was minimally lacerated/frayed so this was debrided with tenotomies.     The median nerve was under a fair amount of tension. I proceeded with an open carpal tunnel release. A longitudinal skin incision was made staying ulnar to the known location of the palmaris longus tendon, in line with the radial border of the ring finger. After skin incision dissection was performed under loupe magnification. With blunt retraction, the transverse carpal ligament was identified and was divided under direct visualization with a 15 blade. I confirmed full distal release with the perineurial fat was identified. Next, the distal 3-4 cm of antebrachial fascia was divided under direct visualization staying ulnar to the palmaris longus tendon and radial to the ulnar neurovascular bundle. Final review confirmed full release of the transverse carpal ligament. Extensive neurolysis of the median nerve was performed, taking care not to injure the palmar cutaneous branch. With the wrist and fingers in gentle flexion, a primary nerve coaptation was possible.    The microscope was brought in. There was evidence of significant damage to the nerve endings. The nerves were trimmed back to healthing appearing fascicles. A primary coaptation was performed under the microscope with interrupted 8-0 nylon suture epineural repair. Care was taken to maintain appropriate orientation both proximally and distally, and to avoid bunching of fascicles. Fibrin glue was used on the coaptation. Given the tension, an 7mm AxoGuard nerve wrap used.     At this point the tourniquet was deflated and hemostasis was obtained with bipolar cautery and pressure.  The wounds were all thoroughly irrigated with sterile saline, taking care to avoid damage to the nerve repair.  All skin incisions were closed with 4-0 nylon horizontal mattress sutures.     The final skin dressing consisted of Xeroform, 4 x 4 gauze, and a well-padded, dorsal  splint with the wrist in 15 degrees of flexion, and the fingers in gentle flexion.     A post operative block was performed by anesthesia for pain control.    The patient tolerated the procedure well and was awakened from general anesthesia without any known difficulties. They were transferred to the PACU in stable condition without any known complications.  All needle counts were correct.       Post-operative Plan   - The patient will remain an outpatient   - The patient will be non-weight bearing on the operative hand  - The patient will be seen in 10-14 days for wound check and suture removal.   - He will need to be placed into a new removable splint at that time, with the wrist in flexion  - Rx for gabapentin provided

## 2022-05-18 PROBLEM — S82.841A CLOSED BIMALLEOLAR FRACTURE OF RIGHT ANKLE: Status: ACTIVE | Noted: 2022-05-18

## 2022-05-19 ENCOUNTER — APPOINTMENT (OUTPATIENT)
Dept: RADIOLOGY | Facility: MEDICAL CENTER | Age: 37
End: 2022-05-19
Attending: ORTHOPAEDIC SURGERY

## 2022-05-19 ENCOUNTER — HOSPITAL ENCOUNTER (EMERGENCY)
Facility: MEDICAL CENTER | Age: 37
End: 2022-05-19
Attending: EMERGENCY MEDICINE | Admitting: ORTHOPAEDIC SURGERY
Payer: COMMERCIAL

## 2022-05-19 ENCOUNTER — ANESTHESIA EVENT (OUTPATIENT)
Dept: SURGERY | Facility: MEDICAL CENTER | Age: 37
End: 2022-05-19

## 2022-05-19 ENCOUNTER — ANESTHESIA (OUTPATIENT)
Dept: SURGERY | Facility: MEDICAL CENTER | Age: 37
End: 2022-05-19

## 2022-05-19 VITALS
DIASTOLIC BLOOD PRESSURE: 84 MMHG | WEIGHT: 253.53 LBS | RESPIRATION RATE: 14 BRPM | HEART RATE: 81 BPM | SYSTOLIC BLOOD PRESSURE: 123 MMHG | TEMPERATURE: 98.2 F | BODY MASS INDEX: 34.38 KG/M2 | OXYGEN SATURATION: 96 %

## 2022-05-19 DIAGNOSIS — S82.841A CLOSED BIMALLEOLAR FRACTURE OF RIGHT ANKLE, INITIAL ENCOUNTER: ICD-10-CM

## 2022-05-19 PROCEDURE — 99291 CRITICAL CARE FIRST HOUR: CPT

## 2022-05-19 PROCEDURE — 160002 HCHG RECOVERY MINUTES (STAT): Performed by: ORTHOPAEDIC SURGERY

## 2022-05-19 PROCEDURE — 160035 HCHG PACU - 1ST 60 MINS PHASE I: Performed by: ORTHOPAEDIC SURGERY

## 2022-05-19 PROCEDURE — 700101 HCHG RX REV CODE 250: Performed by: ANESTHESIOLOGY

## 2022-05-19 PROCEDURE — 500054 HCHG BANDAGE, ELASTIC 6: Performed by: ORTHOPAEDIC SURGERY

## 2022-05-19 PROCEDURE — 01480 ANES OPEN PX LOWER L/A/F NOS: CPT | Performed by: ANESTHESIOLOGY

## 2022-05-19 PROCEDURE — 160048 HCHG OR STATISTICAL LEVEL 1-5: Performed by: ORTHOPAEDIC SURGERY

## 2022-05-19 PROCEDURE — 700111 HCHG RX REV CODE 636 W/ 250 OVERRIDE (IP): Performed by: ANESTHESIOLOGY

## 2022-05-19 PROCEDURE — 96375 TX/PRO/DX INJ NEW DRUG ADDON: CPT

## 2022-05-19 PROCEDURE — 700102 HCHG RX REV CODE 250 W/ 637 OVERRIDE(OP): Performed by: ANESTHESIOLOGY

## 2022-05-19 PROCEDURE — A9270 NON-COVERED ITEM OR SERVICE: HCPCS | Performed by: ANESTHESIOLOGY

## 2022-05-19 PROCEDURE — 27829 TREAT LOWER LEG JOINT: CPT | Mod: ASROC | Performed by: PHYSICIAN ASSISTANT

## 2022-05-19 PROCEDURE — 700111 HCHG RX REV CODE 636 W/ 250 OVERRIDE (IP): Performed by: EMERGENCY MEDICINE

## 2022-05-19 PROCEDURE — C1713 ANCHOR/SCREW BN/BN,TIS/BN: HCPCS | Performed by: ORTHOPAEDIC SURGERY

## 2022-05-19 PROCEDURE — 160041 HCHG SURGERY MINUTES - EA ADDL 1 MIN LEVEL 4: Performed by: ORTHOPAEDIC SURGERY

## 2022-05-19 PROCEDURE — A6454 SELF-ADHER BAND W>=3" <5"/YD: HCPCS | Performed by: ORTHOPAEDIC SURGERY

## 2022-05-19 PROCEDURE — 160029 HCHG SURGERY MINUTES - 1ST 30 MINS LEVEL 4: Performed by: ORTHOPAEDIC SURGERY

## 2022-05-19 PROCEDURE — 27814 TREATMENT OF ANKLE FRACTURE: CPT | Mod: RT | Performed by: ORTHOPAEDIC SURGERY

## 2022-05-19 PROCEDURE — 700105 HCHG RX REV CODE 258: Performed by: ANESTHESIOLOGY

## 2022-05-19 PROCEDURE — 160046 HCHG PACU - 1ST 60 MINS PHASE II: Performed by: ORTHOPAEDIC SURGERY

## 2022-05-19 PROCEDURE — 96374 THER/PROPH/DIAG INJ IV PUSH: CPT

## 2022-05-19 PROCEDURE — 700111 HCHG RX REV CODE 636 W/ 250 OVERRIDE (IP)

## 2022-05-19 PROCEDURE — 501838 HCHG SUTURE GENERAL: Performed by: ORTHOPAEDIC SURGERY

## 2022-05-19 PROCEDURE — 73600 X-RAY EXAM OF ANKLE: CPT | Mod: RT

## 2022-05-19 PROCEDURE — 27829 TREAT LOWER LEG JOINT: CPT | Performed by: ORTHOPAEDIC SURGERY

## 2022-05-19 PROCEDURE — 160036 HCHG PACU - EA ADDL 30 MINS PHASE I: Performed by: ORTHOPAEDIC SURGERY

## 2022-05-19 PROCEDURE — 27814 TREATMENT OF ANKLE FRACTURE: CPT | Mod: ASROC,RT | Performed by: PHYSICIAN ASSISTANT

## 2022-05-19 PROCEDURE — 160009 HCHG ANES TIME/MIN: Performed by: ORTHOPAEDIC SURGERY

## 2022-05-19 PROCEDURE — 96376 TX/PRO/DX INJ SAME DRUG ADON: CPT

## 2022-05-19 PROCEDURE — 160025 RECOVERY II MINUTES (STATS): Performed by: ORTHOPAEDIC SURGERY

## 2022-05-19 DEVICE — SCREW 3.5 MM LOCKING TI X 10MM LONG (6TX8+2TX5=58): Type: IMPLANTABLE DEVICE | Site: ANKLE | Status: FUNCTIONAL

## 2022-05-19 DEVICE — PLATE LOCKING 1/3 TUBULAR 8H (6TX2=12): Type: IMPLANTABLE DEVICE | Site: ANKLE | Status: FUNCTIONAL

## 2022-05-19 DEVICE — SCREW 3.5 MM NON-LOCKING TI X 50MM LONG (6TX8=48): Type: IMPLANTABLE DEVICE | Site: ANKLE | Status: FUNCTIONAL

## 2022-05-19 DEVICE — SCREW 3.5 MM NON-LOCKING TI X 12MM LONG (6TX8+2TX5=58): Type: IMPLANTABLE DEVICE | Site: ANKLE | Status: FUNCTIONAL

## 2022-05-19 RX ORDER — LABETALOL HYDROCHLORIDE 5 MG/ML
5 INJECTION, SOLUTION INTRAVENOUS
Status: DISCONTINUED | OUTPATIENT
Start: 2022-05-19 | End: 2022-05-19 | Stop reason: HOSPADM

## 2022-05-19 RX ORDER — PROPOFOL 10 MG/ML
INJECTION, EMULSION INTRAVENOUS PRN
Status: DISCONTINUED | OUTPATIENT
Start: 2022-05-19 | End: 2022-05-19 | Stop reason: SURG

## 2022-05-19 RX ORDER — HYDROMORPHONE HYDROCHLORIDE 1 MG/ML
INJECTION, SOLUTION INTRAMUSCULAR; INTRAVENOUS; SUBCUTANEOUS
Status: COMPLETED
Start: 2022-05-19 | End: 2022-05-19

## 2022-05-19 RX ORDER — HYDRALAZINE HYDROCHLORIDE 20 MG/ML
5 INJECTION INTRAMUSCULAR; INTRAVENOUS
Status: DISCONTINUED | OUTPATIENT
Start: 2022-05-19 | End: 2022-05-20 | Stop reason: HOSPADM

## 2022-05-19 RX ORDER — MIDAZOLAM HYDROCHLORIDE 1 MG/ML
INJECTION INTRAMUSCULAR; INTRAVENOUS PRN
Status: DISCONTINUED | OUTPATIENT
Start: 2022-05-19 | End: 2022-05-19 | Stop reason: SURG

## 2022-05-19 RX ORDER — SODIUM CHLORIDE, SODIUM LACTATE, POTASSIUM CHLORIDE, CALCIUM CHLORIDE 600; 310; 30; 20 MG/100ML; MG/100ML; MG/100ML; MG/100ML
INJECTION, SOLUTION INTRAVENOUS CONTINUOUS
Status: DISCONTINUED | OUTPATIENT
Start: 2022-05-19 | End: 2022-05-20 | Stop reason: HOSPADM

## 2022-05-19 RX ORDER — CEFAZOLIN SODIUM 1 G/3ML
INJECTION, POWDER, FOR SOLUTION INTRAMUSCULAR; INTRAVENOUS PRN
Status: DISCONTINUED | OUTPATIENT
Start: 2022-05-19 | End: 2022-05-19 | Stop reason: SURG

## 2022-05-19 RX ORDER — MORPHINE SULFATE 4 MG/ML
4 INJECTION INTRAVENOUS ONCE
Status: COMPLETED | OUTPATIENT
Start: 2022-05-19 | End: 2022-05-19

## 2022-05-19 RX ORDER — HALOPERIDOL 5 MG/ML
1 INJECTION INTRAMUSCULAR
Status: DISCONTINUED | OUTPATIENT
Start: 2022-05-19 | End: 2022-05-19 | Stop reason: HOSPADM

## 2022-05-19 RX ORDER — KETAMINE HYDROCHLORIDE 50 MG/ML
INJECTION, SOLUTION INTRAMUSCULAR; INTRAVENOUS PRN
Status: DISCONTINUED | OUTPATIENT
Start: 2022-05-19 | End: 2022-05-19 | Stop reason: SURG

## 2022-05-19 RX ORDER — HYDROMORPHONE HYDROCHLORIDE 1 MG/ML
0.4 INJECTION, SOLUTION INTRAMUSCULAR; INTRAVENOUS; SUBCUTANEOUS
Status: DISCONTINUED | OUTPATIENT
Start: 2022-05-19 | End: 2022-05-19 | Stop reason: HOSPADM

## 2022-05-19 RX ORDER — HYDROMORPHONE HYDROCHLORIDE 1 MG/ML
0.2 INJECTION, SOLUTION INTRAMUSCULAR; INTRAVENOUS; SUBCUTANEOUS
Status: DISCONTINUED | OUTPATIENT
Start: 2022-05-19 | End: 2022-05-20 | Stop reason: HOSPADM

## 2022-05-19 RX ORDER — ONDANSETRON 2 MG/ML
INJECTION INTRAMUSCULAR; INTRAVENOUS PRN
Status: DISCONTINUED | OUTPATIENT
Start: 2022-05-19 | End: 2022-05-19 | Stop reason: SURG

## 2022-05-19 RX ORDER — HYDROMORPHONE HYDROCHLORIDE 1 MG/ML
0.4 INJECTION, SOLUTION INTRAMUSCULAR; INTRAVENOUS; SUBCUTANEOUS
Status: DISCONTINUED | OUTPATIENT
Start: 2022-05-19 | End: 2022-05-20 | Stop reason: HOSPADM

## 2022-05-19 RX ORDER — MEPERIDINE HYDROCHLORIDE 25 MG/ML
12.5 INJECTION INTRAMUSCULAR; INTRAVENOUS; SUBCUTANEOUS
Status: DISCONTINUED | OUTPATIENT
Start: 2022-05-19 | End: 2022-05-20 | Stop reason: HOSPADM

## 2022-05-19 RX ORDER — HYDRALAZINE HYDROCHLORIDE 20 MG/ML
5 INJECTION INTRAMUSCULAR; INTRAVENOUS
Status: DISCONTINUED | OUTPATIENT
Start: 2022-05-19 | End: 2022-05-19 | Stop reason: HOSPADM

## 2022-05-19 RX ORDER — SODIUM CHLORIDE, SODIUM LACTATE, POTASSIUM CHLORIDE, CALCIUM CHLORIDE 600; 310; 30; 20 MG/100ML; MG/100ML; MG/100ML; MG/100ML
INJECTION, SOLUTION INTRAVENOUS
Status: DISCONTINUED | OUTPATIENT
Start: 2022-05-19 | End: 2022-05-19 | Stop reason: SURG

## 2022-05-19 RX ORDER — OXYCODONE HCL 5 MG/5 ML
10 SOLUTION, ORAL ORAL
Status: COMPLETED | OUTPATIENT
Start: 2022-05-19 | End: 2022-05-19

## 2022-05-19 RX ORDER — MEPERIDINE HYDROCHLORIDE 25 MG/ML
12.5 INJECTION INTRAMUSCULAR; INTRAVENOUS; SUBCUTANEOUS
Status: DISCONTINUED | OUTPATIENT
Start: 2022-05-19 | End: 2022-05-19 | Stop reason: HOSPADM

## 2022-05-19 RX ORDER — DEXAMETHASONE SODIUM PHOSPHATE 4 MG/ML
INJECTION, SOLUTION INTRA-ARTICULAR; INTRALESIONAL; INTRAMUSCULAR; INTRAVENOUS; SOFT TISSUE PRN
Status: DISCONTINUED | OUTPATIENT
Start: 2022-05-19 | End: 2022-05-19 | Stop reason: SURG

## 2022-05-19 RX ORDER — ALBUTEROL SULFATE 2.5 MG/3ML
2.5 SOLUTION RESPIRATORY (INHALATION)
Status: DISCONTINUED | OUTPATIENT
Start: 2022-05-19 | End: 2022-05-19 | Stop reason: HOSPADM

## 2022-05-19 RX ORDER — ONDANSETRON 2 MG/ML
4 INJECTION INTRAMUSCULAR; INTRAVENOUS ONCE
Status: COMPLETED | OUTPATIENT
Start: 2022-05-19 | End: 2022-05-19

## 2022-05-19 RX ORDER — DIPHENHYDRAMINE HYDROCHLORIDE 50 MG/ML
12.5 INJECTION INTRAMUSCULAR; INTRAVENOUS
Status: DISCONTINUED | OUTPATIENT
Start: 2022-05-19 | End: 2022-05-19 | Stop reason: HOSPADM

## 2022-05-19 RX ORDER — LABETALOL HYDROCHLORIDE 5 MG/ML
5 INJECTION, SOLUTION INTRAVENOUS
Status: DISCONTINUED | OUTPATIENT
Start: 2022-05-19 | End: 2022-05-20 | Stop reason: HOSPADM

## 2022-05-19 RX ORDER — HYDROMORPHONE HYDROCHLORIDE 1 MG/ML
0.5 INJECTION, SOLUTION INTRAMUSCULAR; INTRAVENOUS; SUBCUTANEOUS
Status: DISCONTINUED | OUTPATIENT
Start: 2022-05-19 | End: 2022-05-19 | Stop reason: HOSPADM

## 2022-05-19 RX ORDER — OXYCODONE HCL 5 MG/5 ML
5 SOLUTION, ORAL ORAL
Status: COMPLETED | OUTPATIENT
Start: 2022-05-19 | End: 2022-05-19

## 2022-05-19 RX ORDER — HALOPERIDOL 5 MG/ML
1 INJECTION INTRAMUSCULAR
Status: DISCONTINUED | OUTPATIENT
Start: 2022-05-19 | End: 2022-05-20 | Stop reason: HOSPADM

## 2022-05-19 RX ORDER — LIDOCAINE HYDROCHLORIDE 20 MG/ML
INJECTION, SOLUTION EPIDURAL; INFILTRATION; INTRACAUDAL; PERINEURAL PRN
Status: DISCONTINUED | OUTPATIENT
Start: 2022-05-19 | End: 2022-05-19 | Stop reason: SURG

## 2022-05-19 RX ORDER — HYDROMORPHONE HYDROCHLORIDE 1 MG/ML
0.1 INJECTION, SOLUTION INTRAMUSCULAR; INTRAVENOUS; SUBCUTANEOUS
Status: DISCONTINUED | OUTPATIENT
Start: 2022-05-19 | End: 2022-05-20 | Stop reason: HOSPADM

## 2022-05-19 RX ORDER — HYDROMORPHONE HYDROCHLORIDE 1 MG/ML
0.2 INJECTION, SOLUTION INTRAMUSCULAR; INTRAVENOUS; SUBCUTANEOUS
Status: DISCONTINUED | OUTPATIENT
Start: 2022-05-19 | End: 2022-05-19 | Stop reason: HOSPADM

## 2022-05-19 RX ORDER — ALBUTEROL SULFATE 2.5 MG/3ML
2.5 SOLUTION RESPIRATORY (INHALATION)
Status: DISCONTINUED | OUTPATIENT
Start: 2022-05-19 | End: 2022-05-20 | Stop reason: HOSPADM

## 2022-05-19 RX ORDER — DIAZEPAM 5 MG/ML
1 INJECTION, SOLUTION INTRAMUSCULAR; INTRAVENOUS
Status: DISCONTINUED | OUTPATIENT
Start: 2022-05-19 | End: 2022-05-20 | Stop reason: HOSPADM

## 2022-05-19 RX ADMIN — HYDROMORPHONE HYDROCHLORIDE 0.4 MG: 1 INJECTION, SOLUTION INTRAMUSCULAR; INTRAVENOUS; SUBCUTANEOUS at 18:26

## 2022-05-19 RX ADMIN — HYDROMORPHONE HYDROCHLORIDE 0.4 MG: 1 INJECTION, SOLUTION INTRAMUSCULAR; INTRAVENOUS; SUBCUTANEOUS at 18:10

## 2022-05-19 RX ADMIN — DEXAMETHASONE SODIUM PHOSPHATE 8 MG: 4 INJECTION, SOLUTION INTRA-ARTICULAR; INTRALESIONAL; INTRAMUSCULAR; INTRAVENOUS; SOFT TISSUE at 17:11

## 2022-05-19 RX ADMIN — LIDOCAINE HYDROCHLORIDE 100 MG: 20 INJECTION, SOLUTION EPIDURAL; INFILTRATION; INTRACAUDAL at 17:07

## 2022-05-19 RX ADMIN — FENTANYL CITRATE 50 MCG: 50 INJECTION, SOLUTION INTRAMUSCULAR; INTRAVENOUS at 17:55

## 2022-05-19 RX ADMIN — OXYCODONE HYDROCHLORIDE 10 MG: 5 SOLUTION ORAL at 17:58

## 2022-05-19 RX ADMIN — HYDROMORPHONE HYDROCHLORIDE 0.2 MG: 1 INJECTION, SOLUTION INTRAMUSCULAR; INTRAVENOUS; SUBCUTANEOUS at 18:35

## 2022-05-19 RX ADMIN — FENTANYL CITRATE 100 MCG: 50 INJECTION, SOLUTION INTRAMUSCULAR; INTRAVENOUS at 17:07

## 2022-05-19 RX ADMIN — PROPOFOL 200 MG: 10 INJECTION, EMULSION INTRAVENOUS at 17:07

## 2022-05-19 RX ADMIN — DIAZEPAM 1 MG: 5 INJECTION, SOLUTION INTRAMUSCULAR; INTRAVENOUS at 19:22

## 2022-05-19 RX ADMIN — MORPHINE SULFATE 4 MG: 4 INJECTION INTRAVENOUS at 09:55

## 2022-05-19 RX ADMIN — SODIUM CHLORIDE, POTASSIUM CHLORIDE, SODIUM LACTATE AND CALCIUM CHLORIDE: 600; 310; 30; 20 INJECTION, SOLUTION INTRAVENOUS at 16:50

## 2022-05-19 RX ADMIN — FENTANYL CITRATE 50 MCG: 50 INJECTION, SOLUTION INTRAMUSCULAR; INTRAVENOUS at 17:22

## 2022-05-19 RX ADMIN — MIDAZOLAM HYDROCHLORIDE 2 MG: 1 INJECTION, SOLUTION INTRAMUSCULAR; INTRAVENOUS at 16:59

## 2022-05-19 RX ADMIN — FENTANYL CITRATE 50 MCG: 50 INJECTION, SOLUTION INTRAMUSCULAR; INTRAVENOUS at 17:17

## 2022-05-19 RX ADMIN — ONDANSETRON 4 MG: 2 INJECTION INTRAMUSCULAR; INTRAVENOUS at 17:26

## 2022-05-19 RX ADMIN — FENTANYL CITRATE 50 MCG: 50 INJECTION, SOLUTION INTRAMUSCULAR; INTRAVENOUS at 18:04

## 2022-05-19 RX ADMIN — CEFAZOLIN 2 G: 330 INJECTION, POWDER, FOR SOLUTION INTRAMUSCULAR; INTRAVENOUS at 17:12

## 2022-05-19 RX ADMIN — KETAMINE HYDROCHLORIDE 100 MG: 50 INJECTION INTRAMUSCULAR; INTRAVENOUS at 17:07

## 2022-05-19 RX ADMIN — MORPHINE SULFATE 4 MG: 4 INJECTION INTRAVENOUS at 12:13

## 2022-05-19 RX ADMIN — ONDANSETRON 4 MG: 2 INJECTION INTRAMUSCULAR; INTRAVENOUS at 09:46

## 2022-05-19 ASSESSMENT — PAIN DESCRIPTION - PAIN TYPE
TYPE: ACUTE PAIN
TYPE: SURGICAL PAIN
TYPE: SURGICAL PAIN
TYPE: ACUTE PAIN
TYPE: SURGICAL PAIN

## 2022-05-19 NOTE — ED PROVIDER NOTES
ED Provider Note    Scribed for Lesvia Burgos M.D. by Venessa Cazares. 2022  8:54 AM    Primary care provider: None reported.  Means of arrival: Walk-In  History obtained from: patient  History limited by: none    CHIEF COMPLAINT  Chief Complaint   Patient presents with   • Ankle Injury     , injury to right ankle seen at [facility]  Enoch Gallego is a 37 y.o. male who presents for evaluation of worsening right ankle pain secondary to an injury onset 6 days ago. He states he was running, tripped in a hole in the grass, and injured his ankle while in [place]. He reports he went to Urgent Care where he was sedated for splint placement. He states he was instructed to come to the Emergency Department in one week. He reports he was seen at the [facility] 2 days ago and is scheduled for surgery in 4 days. He states he has been in constant pain since his injury, which has not changed. He reports he was prescribed Wolf by urgent care and by the [facility], but took his last pill today and has not yet picked up his prescription refill. He admits to associated symptoms of tingling to right foot, but denies numbness to right foot. He denies any history of major medical problems.      PPE Note: I personally donned PPE for all patient encounters during this visit, with an N95 respirator mask and gloves.      Scribe remained outside the patient's room and did not have any contact with the patient for the duration of patient encounter.     REVIEW OF SYSTEMS  Pertinent positives include: right ankle pain and tingling to right foot.   Pertinent negatives include no: numbness to right foot.    See [facility] for further details.    PAST MEDICAL HISTORY   Patient states none.    FAMILY HISTORY  None pertinent.     SOCIAL HISTORY  Social History     Tobacco Use   • Smoking status: Former Smoker     Types: Cigarettes     Quit date: 2016     Years since quittin.4   • Smokeless tobacco: Never Used   Vaping Use   • Vaping Use: Never  used   Substance Use Topics   • Alcohol use: Yes     Alcohol/week: 1.2 - 1.8 oz     Types: 2 - 3 Cans of beer per week   • Drug use: Never      Social History     Substance and Sexual Activity   Drug Use Never       SURGICAL HISTORY  Past Surgical History:   Procedure Laterality Date   • WRIST EXPLORATION Right 9/24/2021    Procedure: EXPLORATION, WRIST - AND WASHOUT, NERVE PROTECTOR ALLOGRAFT;  Surgeon: Vy Chaudhry M.D.;  Location: SURGERY SAME DAY [facility];  Service: Orthopedics   • NERVE REPAIR Right 9/24/2021    Procedure: REPAIR, NERVE;  Surgeon: Vy Chaudhry M.D.;  Location: SURGERY SAME DAY [facility];  Service: Orthopedics   • TENDON REPAIR Right 9/24/2021    Procedure: REPAIR, TENDON;  Surgeon: Vy Chaudhry M.D.;  Location: SURGERY SAME DAY [facility];  Service: Orthopedics   • CARPAL TUNNEL RELEASE Right 9/24/2021    Procedure: RELEASE, CARPAL TUNNEL;  Surgeon: Vy Chaudhry M.D.;  Location: SURGERY SAME DAY [facility];  Service: Orthopedics       CURRENT MEDICATIONS  Home Medications     Reviewed by Janki Castillo R.N. (Registered Nurse) on 05/19/22 at 0759  Med List Status: Partial   Medication Last Dose Status   cephALEXin (KEFLEX) 500 MG Cap  Active   HYDROcodone-acetaminophen (NORCO) 5-325 MG Tab per tablet  Active   oxyCODONE immediate-release (ROXICODONE) 5 MG Tab  Active                ALLERGIES  No Known Allergies    PHYSICAL EXAM  VITAL SIGNS: BP (!) 144/94   Pulse (!) 108   Temp 37.5 °C (99.5 °F) (Temporal)   Resp 18   Wt 115 kg (253 lb 8.5 oz)   SpO2 95%   BMI 34.38 kg/m²      Constitutional: Alert in no apparent distress.  HENT: Normocephalic, Bilateral external ears normal. Nose normal.   Eyes: Pupils are equal and reactive. Conjunctiva normal, non-icteric.   Thorax & Lungs: Easy unlabored respirations  Skin: Visualized skin is  Dry, No erythema, No rash.   Extremities: Right lower extremity:. Well positioned posterior splint with stirrup component. 2+ DP pulse,  good capillary refill. Full ROM to the digits.  Neurologic: Alert,   Clear speech  Psychiatric: Affect and Mood normal    COURSE & MEDICAL DECISION MAKING  Pertinent Labs & Imaging studies reviewed. (See chart for details)    Reviewed patient's old medical records which showed he was seen at the [facility] yesterday by Kenji Long PA-C. He was found to have a bimalleolar  fracture which is unstable and surgical intervention recommended. He was set up for surgery on May 23rd, but Dr. Haynes wrote due to increasing pain, operative intervention was scheduled for today. Patient claims the injury happened when stepped in a hole in the [employer], however records reveal injury occurred on May 13 after he fell down some stairs.       8:54 AM - Patient seen and examined at bedside. Discussed plan of care, including consult with [facility]. Patient agrees to the plan of care. The patient will be medicated with morphine 4 mg injection and Zofran 4 mg injection.     9:05 AM Paged Ortho (Dr. Haynes).     9:13 AM I discussed the patient's case and the above findings with Dr. Haynes (Ortho) who will take him to surgery this afternoon.    9:15 AM - I reevaluated the patient at bedside. I informed the patient of my plan to admit today given the patient's current presentation and diagnostic study results. Patient verbalizes understanding and support with my plan for admission.     Patient presents to the ER complaining of worsening right ankle pain.  6 days ago he tripped in a hole in the grass and sustained a bimalleolar fracture.  This happened in [place].  He went to the [facility] urgent care yesterday and bimalleolar fracture was concern.  Given his instability, he was scheduled for surgery next week.  However, due to increased pain and some concern over patient reliability, it was decided by orthopedic surgery to perform ORIF today.  I spoke with Dr. Haynes, orthopedic surgeon on-call, and he will take the patient  to the operating room later today for ORIF of bimalleolar fracture.  At this time patient is in a well-positioned splint.  He has been given pain medication.  He has been maintained n.p.o.  He is neurovascular intact.  At this time further evaluation and management done by orthopedic surgery.    DISPOSITION:  Patient will be hospitalized by Dr. Haynes in stable condition.    FINAL IMPRESSION  1. Closed bimalleolar fracture of right ankle, initial encounter Acute         Venessa PRATT (Scribe), am scribing for, and in the presence of, Lesvia Burgos M.D..    Electronically signed by: Venessa Cazares (Scribe), 5/19/2022    Lesvia PRATT M.D. personally performed the services described in this documentation, as scribed by Venessa Cazares in my presence, and it is both accurate and complete.    This dictation has been created using voice recognition software. The accuracy of the dictation is limited by the abilities of the software. I expect there may be some errors of grammar and possibly content. I made every attempt to manually correct the errors within my dictation. However, errors related to voice recognition software may still exist and should be interpreted within the appropriate context.    The note accurately reflects work and decisions made by me.  Lesvia Burgos M.D.  5/19/2022  5:28 PM

## 2022-05-19 NOTE — ED TRIAGE NOTES
.  Chief Complaint   Patient presents with   • Ankle Injury     5/13, injury to right ankle seen at [facility]     Pt seen at [facility] on 5/12, 5/18 reports was told to come to the ED for possible surgery. Pain to right ankle 8/10 taking oxycodone as prescribed

## 2022-05-19 NOTE — ANESTHESIA PREPROCEDURE EVALUATION
Case: [id number] Date/Time: 228    Procedure: ORIF, ANKLE (Right )    Location: [place] [address] / SURGERY [facility]    Surgeons: Mitul Haynes M.D.          Relevant Problems   Other   (positive) Closed bimalleolar fracture of right ankle     Anes H&P:  PAST MEDICAL HISTORY:   37 y.o. male who presents for Procedure(s) (LRB):  ORIF, ANKLE (Right).  He has current and past medical problems significant for:    History reviewed. No pertinent past medical history.    SMOKING/ALCOHOL/RECREATIONAL DRUG USE:  Social History     Tobacco Use   • Smoking status: Former Smoker     Types: Cigarettes     Quit date: 2016     Years since quittin.4   • Smokeless tobacco: Never Used   Vaping Use   • Vaping Use: Never used   Substance Use Topics   • Alcohol use: Yes     Alcohol/week: 1.2 - 1.8 oz     Types: 2 - 3 Cans of beer per week   • Drug use: Never     Social History     Substance and Sexual Activity   Drug Use Never       PAST SURGICAL HISTORY:  Past Surgical History:   Procedure Laterality Date   • WRIST EXPLORATION Right 2021    Procedure: EXPLORATION, WRIST - AND WASHOUT, NERVE PROTECTOR ALLOGRAFT;  Surgeon: Vy Chaudhry M.D.;  Location: SURGERY SAME DAY [facility];  Service: Orthopedics   • NERVE REPAIR Right 2021    Procedure: REPAIR, NERVE;  Surgeon: Vy Chaudhry M.D.;  Location: SURGERY SAME DAY [facility];  Service: Orthopedics   • TENDON REPAIR Right 2021    Procedure: REPAIR, TENDON;  Surgeon: Vy Chaudhry M.D.;  Location: SURGERY SAME DAY [facility];  Service: Orthopedics   • CARPAL TUNNEL RELEASE Right 2021    Procedure: RELEASE, CARPAL TUNNEL;  Surgeon: Vy Chaudhry M.D.;  Location: SURGERY SAME DAY [facility];  Service: Orthopedics       ALLERGIES:   No Known Allergies    MEDICATIONS:  No current facility-administered medications on file prior to encounter.     Current Outpatient Medications on File Prior to Encounter   Medication Sig Dispense Refill   •  oxyCODONE immediate-release (ROXICODONE) 5 MG Tab Take 1 Tablet by mouth every 6 hours as needed for Severe Pain for up to 5 days. 20 Tablet 0       LABS:  No results found for: HEMOGLOBIN, HEMATOCRIT, WBC  No results found for: SODIUM, POTASSIUM, CHLORIDE, CO2, GLUCOSE, BUN, CALCIUM      PREVIOUS ANESTHETICS: See EMR  __________________________________________      Physical Exam    Airway   Mallampati: II  TM distance: >3 FB  Neck ROM: full       Cardiovascular - normal exam  Rhythm: regular  Rate: normal  (-) murmur     Dental - normal exam        Facial Hair   Pulmonary - normal exam  Breath sounds clear to auscultation     Abdominal    Neurological - normal exam                 Anesthesia Plan    ASA 1       Plan - general       Airway plan will be LMA    (Pt refused block)      Induction: intravenous    Postoperative Plan: Postoperative administration of opioids is intended.    Pertinent diagnostic labs and testing reviewed    Informed Consent:    Anesthetic plan and risks discussed with patient.    Use of blood products discussed with: patient whom consented to blood products.

## 2022-05-19 NOTE — ED NOTES
Med Rec completed per patient   Allergies reviewed  No ORAL antibiotics in last 30 days        
Nurse at bedside to medicate pt per MAR.  
Pt is back from bathroom, resting quietly in chair. Pt is placed on SPO2 monitoring per Medication given. Pt has no complaints at this time.   
Pt is gowned.     Pt taken to OR.   
Pt medicated per MAR.  
Pt self ambulated to bathroom.   
Pt stated his pain is increasing.  ERP notified.  
Pt to room on crutches.  Pt GCS 15,  A&O x 4.  Pt was seen at urgent care a week ago.  Pt was told to come to ER for increasing pain.  Pt states pain is 8/10 and has been getting worse for 6 days.  Chart up for ERP.  
Pt updated on surgery time.  Educated on NPO status.  Pt resting comfortably.  
Report given to preop nurse.  Pt has been NPO since 2100 yesterday.  
no

## 2022-05-20 NOTE — OP REPORT
DATE OF OPERATION: 5/19/2022     PREOPERATIVE DIAGNOSIS:  1. Right bimalleolar ankle fracture       2. Right ankle syndesmosis injury    POSTOPERATIVE DIAGNOSIS: Same    PROCEDURE PERFORMED:  1. Open treatment of right bimalleolar ankle fracture with internal fixation                                                    2. Open repair right syndesmosis with internal fixation    SURGEON: Mitul Haynes M.D.     ASSISTANT: Suhail Long PA-C    ANESTHESIOLOGIST: MD Allyson    ANESTHESIA: General    ESTIMATED BLOOD LOSS: 10 mL    INDICATIONS: The patient is a 37 y.o. male with a right  ankle fracture resulting from a fall.  The patient denies antecedent pain, and was found to have a normal neurovascular exam and skin envelope.  Radiographs reviewed by myself demonstrated the ankle fracture.  Given these findings, surgical treatment of the ankle fracture was indicated.  I discussed the risks and benefits of the procedure, including the risks of infection, wound healing complication, neurovascular injury, compartment syndrome, pain, malunion, non-union, malrotation, and the medical risks of anesthesia including DVT, PE, MI, stroke, and death.  Benefits include early mobilization, improved chance of union, and reduction in the medical risks of ankle fractures.  Alternatives to surgery were also discussed, including non-operative management.  The patient signed the informed consent and the operative extremity was marked.        PROCEDURE:  The patient underwent anesthesia, and was positioned supine on a radiolucent table and all bony prominences were well padded.  Preoperative antibiotics were administered. Sequential compression devices were employed. The correct operative site was prepped and draped into a sterile field. A procedural pause was conducted to verify correct patient, correct extremity, presence of the surgeons initials on the operative extremity.     A well padded tourniquet was inflated to 250mmHg and  a lateral incision was made over the fibula with care taken to avoid all neurovascular structures. Soft tissue stripping was avoided to preserve blood flow to bone and maximize healing potential. The lateral malleolus fracture was reduced with reduction clamps and OIC plate was applied with a combination of locking and non-locking fixation. Good reduction was achieved.  The syndesmosis was stressed under flouroscopic guidance and found to open so was clamped and held with an OIC small fragment screw. The posterior malleolar fragment was less than 10% of joint and no fixation was placed in this.  All screws were checked and found to be of appropriate length and out of the joint.  Wounds were irrigated with normal saline, and closed in layers, with 1-0 Vicyrl for fascia, 2-0 Vicryl in the subcutaneous tissue, and staples in the skin.  Sterile dressings were applied. Walking boot was applied     The patient tolerated the procedure well. There were no apparent complications. All sponge, needle, and instrument counts were correct on two separate occasions. He was awakened, extubated, and transferred to the recovery room in satisfactory condition.      The use of Suhail Long as a surgical assistant was necessary for assistance with exposure, retraction, fracture reduction, instrumentation, and closure.     Post-Operative Plan:     1.  The patient should remain toe touch weightbearing on their operative extremity.  Gait aids (crutch or crutches, cane, walker) may be used as needed, and may be discontinued when no longer required.  2.  IV antibiotics - may be continued for 24 hours, but are not required.  3.  DVT prophylaxis - SCD's and Lovenox 40 mg SQ daily while inpatient.  The patient may transition to Aspirin 325 mg PO BID as an outpatient  4.  Discharge planning   ____________________________________   Mitul Haynes M.D.   DD: 5/19/2022  5:31 PM

## 2022-05-20 NOTE — ANESTHESIA TIME REPORT
Anesthesia Start and Stop Event Times     Date Time Event    5/19/2022 1648 Ready for Procedure     1658 Anesthesia Start     1748 Anesthesia Stop        Responsible Staff  05/19/22    Name Role Begin End    Jed Valadez M.D. Anesth 1658 1744        Overtime Reason:  no overtime (within assigned shift)    Comments:

## 2022-05-20 NOTE — ANESTHESIA POSTPROCEDURE EVALUATION
Patient: Enoch Gallego    Procedure Summary     Date: 05/19/22 Room / Location: [place] [address] / SURGERY [facility]    Anesthesia Start: 1658 Anesthesia Stop: 1748    Procedure: ORIF, ANKLE (Right ) Diagnosis: (Displaced right ankle fracture )    Surgeons: Mitul Haynes M.D. Responsible Provider: Jed Valadez M.D.    Anesthesia Type: general ASA Status: 1          Final Anesthesia Type: general  Last vitals  BP   Blood Pressure: 123/84    Temp   36.8 °C (98.2 °F)    Pulse   81   Resp   14    SpO2   96 %      Anesthesia Post Evaluation    Patient location during evaluation: PACU  Patient participation: complete - patient participated  Level of consciousness: sleepy but conscious    Airway patency: patent  Anesthetic complications: no  Cardiovascular status: hemodynamically stable  Respiratory status: acceptable  Hydration status: balanced    PONV: none          No complications documented.     Nurse Pain Score: 6 (NPRS)

## 2022-05-20 NOTE — ANESTHESIA PROCEDURE NOTES
Airway    Date/Time: 5/19/2022 5:08 PM  Performed by: Jed Valadez M.D.  Authorized by: Jed Valadez M.D.     Location:  OR  Urgency:  Elective  Difficult Airway: No    Indications for Airway Management:  Anesthesia      Spontaneous Ventilation: absent    Sedation Level:  Deep  Preoxygenated: Yes    Mask Difficulty Assessment:  0 - not attempted  Final Airway Type:  Supraglottic airway  Final Supraglottic Airway:  Standard LMA    SGA Size:  4  Number of Attempts at Approach:  1

## 2022-05-26 NOTE — H&P
Surgery Orthopedic History & Physical Note    Date  2022    Primary Care Physician  Pcp Pt States None    CC  * No Diagnosis Codes entered *    HPI  This is a 37 y.o. male who presented with right ankle fracture.  He is here for operative fixation    History reviewed. No pertinent past medical history.    Past Surgical History:   Procedure Laterality Date   • ORIF, ANKLE Right 2022    Procedure: ORIF, ANKLE;  Surgeon: Mitul Haynes M.D.;  Location: SURGERY [facility];  Service: Orthopedics   • WRIST EXPLORATION Right 2021    Procedure: EXPLORATION, WRIST - AND WASHOUT, NERVE PROTECTOR ALLOGRAFT;  Surgeon: Vy Chaudhry M.D.;  Location: SURGERY SAME DAY [facility];  Service: Orthopedics   • NERVE REPAIR Right 2021    Procedure: REPAIR, NERVE;  Surgeon: Vy Chaudhry M.D.;  Location: SURGERY SAME DAY [facility];  Service: Orthopedics   • TENDON REPAIR Right 2021    Procedure: REPAIR, TENDON;  Surgeon: Vy Chaudhry M.D.;  Location: SURGERY SAME DAY [facility];  Service: Orthopedics   • CARPAL TUNNEL RELEASE Right 2021    Procedure: RELEASE, CARPAL TUNNEL;  Surgeon: Vy Chaudhry M.D.;  Location: SURGERY SAME DAY [facility];  Service: Orthopedics       No current facility-administered medications for this encounter.     Current Outpatient Medications   Medication Sig Dispense Refill   • oxyCODONE immediate-release (ROXICODONE) 5 MG Tab Take 1 Tablet by mouth every four hours as needed for Severe Pain for up to 5 days. 30 Tablet 0       Social History     Socioeconomic History   • Marital status: Unknown     Spouse name: Not on file   • Number of children: Not on file   • Years of education: Not on file   • Highest education level: Not on file   Occupational History   • Not on file   Tobacco Use   • Smoking status: Former Smoker     Types: Cigarettes     Quit date: 2016     Years since quittin.5   • Smokeless tobacco: Never Used   Vaping Use   • Vaping Use: Never  used   Substance and Sexual Activity   • Alcohol use: Yes     Alcohol/week: 1.2 - 1.8 oz     Types: 2 - 3 Cans of beer per week   • Drug use: Never   • Sexual activity: Not on file   Other Topics Concern   • Not on file   Social History Narrative   • Not on file     Social Determinants of Health     Financial Resource Strain: Not on file   Food Insecurity: Not on file   Transportation Needs: Not on file   Physical Activity: Not on file   Stress: Not on file   Social Connections: Not on file   Intimate Partner Violence: Not on file   Housing Stability: Not on file       No family history on file.    Allergies  Patient has no known allergies.    Review of Systems  Negative except for Right ankle pain    Physical Exam  Right ankle pain and tenderness  Vital Signs  Blood Pressure: 123/84   Temperature: 36.8 °C (98.2 °F)   Pulse: 81   Respiration: 14   Pulse Oximetry: 96 %       Labs:                    Radiology:  Displaced right bimalleolar ankle fracture  DX-ANKLE 2- VIEWS RIGHT   Final Result      Intraoperative image(s) as described.      DX-PORTABLE FLUOROSCOPY < 1 HOUR   Final Result      Portable fluoroscopy utilized for 10 seconds.         INTERPRETING LOCATION: [address] [place] [address] [facility], [facility]            Assessment/Plan:  Right ankle fracture.  Plan is for surgical intervention today  Procedure(s):  ORIF, ANKLE

## 2023-08-07 NOTE — OR NURSING
PT arrived to PACU from OR. VSS. Dressing to RLE CDI, walking boot in place. Oral airway removed when PT more awake. PT reported severe pain, medicated per MAR. Denied nausea. Reported muscle spasms, notified Dr. Valadez, orders placed and pt medicated. PT reports pain is now tolerable and improved. Discharge instructions discussed with spouse and patient, all questions answered. PT already has prescribed medications at home. PT already has crutches for ambulating. PT assisted with dressing. PIV removed. All belongings given to patient. PT taken out to vehicle via wheelchair by this RN. Assisted into vehicle, tolerated well.   
Statement Selected

## (undated) DEVICE — TUBE CONNECTING SUCTION - CLEAR PLASTIC STERILE 72 IN (50EA/CA)

## (undated) DEVICE — SODIUM CHL IRRIGATION 0.9% 1000ML (12EA/CA)

## (undated) DEVICE — SUCTION INSTRUMENT YANKAUER BULBOUS TIP W/O VENT (50EA/CA)

## (undated) DEVICE — PACK LOWER EXTREMITY - (2/CA)

## (undated) DEVICE — ELECTRODE DUAL RETURN W/ CORD - (50/PK)

## (undated) DEVICE — SUTURE 8-0 NYLON BV 130-5 (12PK/BX)

## (undated) DEVICE — BANDAGE ELASTIC STERILE MATRIX 6 X 10 (20EA/CA)

## (undated) DEVICE — NEPTUNE 4 PORT MANIFOLD - (20/PK)

## (undated) DEVICE — SUTURE 0 VICRYL PLUS CT-1 - 8 X 18 INCH (12/BX)

## (undated) DEVICE — ELECTRODE 850 FOAM ADHESIVE - HYDROGEL RADIOTRNSPRNT (50/PK)

## (undated) DEVICE — CATHETER IV 20 GA X 1-1/4 ---SURG.& SDS ONLY--- (50EA/BX)

## (undated) DEVICE — SLING ORTH UNV TIETX VLFM ARM

## (undated) DEVICE — GLOVE BIOGEL SZ 8 SURGICAL PF LTX - (50PR/BX 4BX/CA)

## (undated) DEVICE — CORD ELECTROSURGICAL BIPOLAR FORCEPS VALLEYLAP 12 (50EA/CA)"

## (undated) DEVICE — TUBING CLEARLINK DUO-VENT - C-FLO (48EA/CA)

## (undated) DEVICE — CANISTER SUCTION 3000ML MECHANICAL FILTER AUTO SHUTOFF MEDI-VAC NONSTERILE LF DISP  (40EA/CA)

## (undated) DEVICE — HEAD HOLDER JUNIOR/ADULT

## (undated) DEVICE — TOWEL STOP TIMEOUT SAFETY FLAG (40EA/CA)

## (undated) DEVICE — DRAPE MICROSCOPE 46 X 80 - 10/CA

## (undated) DEVICE — MASK ANESTHESIA ADULT  - (100/CA)

## (undated) DEVICE — SET EXTENSION WITH 2 PORTS (48EA/CA) ***PART #2C8610 IS A SUBSTITUTE*****

## (undated) DEVICE — PADDING CAST 6 IN STERILE - 6 X 4 YDS (24/CA)

## (undated) DEVICE — PROTECTOR ULNA NERVE - (36PR/CA)

## (undated) DEVICE — SUTURE 4-0 ETHILON FS-2 18 (36PK/BX)"

## (undated) DEVICE — SUTURE GENERAL

## (undated) DEVICE — KIT ANESTHESIA W/CIRCUIT & 3/LT BAG W/FILTER (20EA/CA)

## (undated) DEVICE — SUTURE 5-0 PROLENE C-1 D/A 24 (36PK/BX)"

## (undated) DEVICE — WATER IRRIGATION STERILE 1000ML (12EA/CA)

## (undated) DEVICE — Device

## (undated) DEVICE — SUTURE 2-0 VICRYL PLUS CT-1 - 8 X 18 INCH(12/BX)

## (undated) DEVICE — WRAP COBAN SELF-ADHERENT 6 IN X  5YDS STERILE TAN (12/CA)

## (undated) DEVICE — BANDAGE ELASTIC 2 X 5 YDS - STERILE VELCRO (25/CA) LATEX

## (undated) DEVICE — SUTURE 3-0 VICRYL PLUS SH - 27 INCH (36/BX)

## (undated) DEVICE — SPLINT PLASTER 4 IN  X 15 IN - (50/BX 12BX/CA)

## (undated) DEVICE — PADDING CAST 4 IN STERILE - 4 X 4 YDS (24/CA)

## (undated) DEVICE — SUTURE 3-0 ETHILON FS-1 - (36/BX) 30 INCH

## (undated) DEVICE — 3-0 SUPRAMID CP-30

## (undated) DEVICE — CHLORAPREP 26 ML APPLICATOR - ORANGE TINT(25/CA)

## (undated) DEVICE — DRILL BIT 2.8MM X 155MM CALIBRATED (8TX2=16)

## (undated) DEVICE — DEVICE CLOSURE KIT VISTASEAL 4ML (1EA/BX)

## (undated) DEVICE — DRAPE 36X28IN RAD CARM BND BG - (25/CA) O

## (undated) DEVICE — GLOVE BIOGEL INDICATOR SZ 8 SURGICAL PF LTX - (50/BX 4BX/CA)

## (undated) DEVICE — GLOVE BIOGEL SZ 6.5 SURGICAL PF LTX (50PR/BX 4BX/CA)

## (undated) DEVICE — PAD LAP STERILE 18 X 18 - (5/PK 40PK/CA)

## (undated) DEVICE — SENSOR SPO2 NEO LNCS ADHESIVE (20/BX) SEE USER NOTES

## (undated) DEVICE — SLEEVE VASO CALF MED - (10PR/CA)

## (undated) DEVICE — BANDAGE ELASTIC 6 HONEYCOMB - 6X5YD LF (20/CA)"

## (undated) DEVICE — PAD PREP 24 X 48 CUFFED - (100/CA)

## (undated) DEVICE — BANDAGE ELASTIC 4 HONEYCOMB - 4"X5YD LF (20/CA)"

## (undated) DEVICE — NERVE STIMULATOR VARI-STEM - 10/PK

## (undated) DEVICE — KIT  I.V. START (100EA/CA)

## (undated) DEVICE — DRESSING TEGADERM 8 X 12 - (10/BX 8BX/CA)

## (undated) DEVICE — CANISTER SUCTION RIGID RED 1500CC (40EA/CA)

## (undated) DEVICE — GLOVE SZ 7.5 BIOGEL PI MICRO - PF LF (50PR/BX)

## (undated) DEVICE — GOWN WARMING STANDARD FLEX - (30/CA)

## (undated) DEVICE — GLOVE BIOGEL INDICATOR SZ 7SURGICAL PF LTX - (50/BX 4BX/CA)

## (undated) DEVICE — SET LEADWIRE 5 LEAD BEDSIDE DISPOSABLE ECG (1SET OF 5/EA)

## (undated) DEVICE — LACTATED RINGERS INJ 1000 ML - (14EA/CA 60CA/PF)

## (undated) DEVICE — SLEEVE, VASO, THIGH, MED

## (undated) DEVICE — BLADE SURGICAL #10 - (50/BX)

## (undated) DEVICE — PACK UPPER EXTREMITY (2EA/CA)

## (undated) DEVICE — GLOVE BIOGEL PI INDICATOR SZ 7.0 SURGICAL PF LF - (50/BX 4BX/CA)

## (undated) DEVICE — SPLINT PLASTER 5 IN X 30 IN - (50EA/BX 6BX/CA)

## (undated) DEVICE — GLOVE BIOGEL SZ 7.5 SURGICAL PF LTX - (50PR/BX 4BX/CA)